# Patient Record
Sex: MALE | Race: WHITE | URBAN - METROPOLITAN AREA
[De-identification: names, ages, dates, MRNs, and addresses within clinical notes are randomized per-mention and may not be internally consistent; named-entity substitution may affect disease eponyms.]

---

## 2018-06-21 ENCOUNTER — OFFICE VISIT (OUTPATIENT)
Dept: FAMILY MEDICINE | Facility: CLINIC | Age: 73
End: 2018-06-21
Payer: COMMERCIAL

## 2018-06-21 VITALS
TEMPERATURE: 97.5 F | HEIGHT: 66 IN | HEART RATE: 64 BPM | DIASTOLIC BLOOD PRESSURE: 72 MMHG | WEIGHT: 154 LBS | SYSTOLIC BLOOD PRESSURE: 124 MMHG | BODY MASS INDEX: 24.75 KG/M2

## 2018-06-21 DIAGNOSIS — F43.10 POST TRAUMATIC STRESS DISORDER DUE TO WAR, TERRORISM, OR HOSTILITY: Primary | ICD-10-CM

## 2018-06-21 PROBLEM — F32.2 MAJOR DEPRESSIVE DISORDER, SINGLE EPISODE, SEVERE WITHOUT PSYCHOTIC FEATURES (H): Status: ACTIVE | Noted: 2018-06-21

## 2018-06-21 PROBLEM — Z86.59 HISTORY OF POSTTRAUMATIC STRESS DISORDER (PTSD): Status: ACTIVE | Noted: 2018-06-21

## 2018-06-21 PROCEDURE — 99203 OFFICE O/P NEW LOW 30 MIN: CPT | Performed by: FAMILY MEDICINE

## 2018-06-21 RX ORDER — CHOLECALCIFEROL (VITAMIN D3) 25 MCG
TABLET,CHEWABLE ORAL DAILY
COMMUNITY

## 2018-06-21 RX ORDER — HYDROXYZINE HYDROCHLORIDE 25 MG/1
25-50 TABLET, FILM COATED ORAL EVERY 6 HOURS PRN
Qty: 60 TABLET | Refills: 1 | Status: SHIPPED | OUTPATIENT
Start: 2018-06-21

## 2018-06-21 RX ORDER — PRAVASTATIN SODIUM 40 MG
40 TABLET ORAL DAILY
COMMUNITY

## 2018-06-21 RX ORDER — HYDROCHLOROTHIAZIDE 25 MG/1
25 TABLET ORAL DAILY
COMMUNITY

## 2018-06-21 RX ORDER — ATENOLOL 25 MG/1
25 TABLET ORAL DAILY
COMMUNITY

## 2018-06-21 RX ORDER — MULTIVITAMIN/IRON/FOLIC ACID 18MG-0.4MG
TABLET ORAL DAILY
COMMUNITY

## 2018-06-21 RX ORDER — LOSARTAN POTASSIUM 100 MG/1
100 TABLET ORAL DAILY
COMMUNITY

## 2018-06-21 NOTE — MR AVS SNAPSHOT
After Visit Summary   6/21/2018    Tank Alexandra    MRN: [id number]           Patient Information     Date Of Birth          1945        Visit Information        Provider Department      6/21/2018 1:40 PM Erik Amin MD [facility]        Today's Diagnoses     Post traumatic stress disorder due to war, terrorism, or hostility    -  1      Care Instructions    [facility]   Discharged by : madina  Paper scripts provided to patient : none     If you have any questions regarding your visit please contact your care team:     Team Gold                Clinic Hours Telephone Number     Dr. Karina Amin  Samira Carringtonbea, CNP 7am-7pm  Monday - Thursday   7am-5pm  Fridays  [phone]   (Appointment scheduling available 24/7)     RN Line  [phone] option 2     Urgent Care - [place] and [facility] - 11am-9pm Monday-Friday Saturday-Sunday- 9am-5pm     [place] -   5pm-9pm Monday-Friday Saturday-Sunday- 9am-5pm    [phone] - [place]    [phone] - [place]       For a Price Quote for your services, please call our Consumer Price Line at [phone].     What options do I have for visits at the clinic other than the traditional office visit?     To expand how we care for you, many of our providers are utilizing electronic visits (e-visits) and telephone visits, when medically appropriate, for interactions with their patients rather than a visit in the clinic. We also offer nurse visits for many medical concerns. Just like any other service, we will bill your insurance company for this type of visit based on time spent on the phone with your provider. Not all insurance companies cover these visits. Please check with your medical insurance if this type of visit is covered. You will be responsible for any charges that are not paid by your insurance.   E-visits via  Oculo Therapyhart: generally incur a $35.00 fee.     Telephone visits:  Time spent on the phone: *charged based on time that is spent on the phone in increments of 10 minutes. Estimated cost:   5-10 mins $30.00   11-20 mins. $59.00   21-30 mins. $85.00       Use Oculo Therapyhart (secure email communication and access to your chart) to send your primary care provider a message or make an appointment. Ask someone on your Team how to sign up for ImmusanTt.     As always, Thank you for trusting us with your health care needs!      [facility]:    Scheduling Appointments  [facility]  Call: [phone]    [facility]  Call: [phone]    [facility]  Call: [phone]    For Gastroenterology referrals   [facility] Gastroenterology   Clinics and Surgery Center, 4th Floor   [address] [place] [address]   Appointments: [phone]    WHERE TO GO FOR CARE?  Clinic    Make an appointment if you:       Are sick (cold, cough, flu, sore throat, earache or in pain).       Have a small injury (sprain, small cut, burn or broken bone).       Need a physical exam, Pap smear, vaccine or prescription refill.       Have questions about your health or medicines.    To reach us:      Call [phone] ([phone]). Open 24 hours every day. (For counseling services, call [phone].)    Log into SilMach at [email or web address]. (Visit [email or web address] to create an account.) Hospital emergency room    An emergency is a serious or life- threatening problem that must be treated right away.    Call 033 or get to the hospital if you have:      Very bad or sudden:            - Chest pain or pressure         - Bleeding         - Head or belly pain         - Dizziness or trouble seeing, walking or                          Speaking      Problems breathing      Blood in your vomit or you are coughing up blood      A major injury (knocked out, loss of  a finger or limb, rape, broken bone protruding from skin)    A mental health crisis. (Or call the Mental Health Crisis line at 1-938.817.6241 or Suicide Prevention Hotline at 1-869.337.7143.)    Open 24 hours every day. You don't need an appointment.     Urgent care    Visit urgent care for sickness or small injuries when the clinic is closed. You don't need an appointment. To check hours or find an urgent care near you, visit [email or web address]. Online care    Get online care from [employer] for more than 70 common problems, like colds, allergies and infections. Open 24 hours every day at:   [email or web address]   Need help deciding?    For advice about where to be seen, you may call your clinic and ask to speak with a nurse. We're here for you 24 hours every day.         If you are deaf or hard of hearing, please let us know. We provide many free services including sign language interpreters, oral interpreters, TTYs, telephone amplifiers, note takers and written materials.                   Follow-ups after your visit        Additional Services     MENTAL HEALTH REFERRAL  - Adult; Outpatient Treatment; Individual/Couples/Family/Group Therapy/Health Psychology; Other: Behavioral Healthcare Providers [phone]; We will contact you to schedule the appointment or please call with any questi...       All scheduling is subject to the client's specific insurance plan & benefits, provider/location availability, and provider clinical specialities.  Please arrive 15 minutes early for your first appointment and bring your completed paperwork.    Please be aware that coverage of these services is subject to the terms and limitations of your health insurance plan.  Call member services at your health plan with any benefit or coverage questions.                            Who to contact     If you have questions or need follow up information about today's clinic visit or your schedule please contact [facility]  "directly at [phone].  Normal or non-critical lab and imaging results will be communicated to you by SnowGatehart, letter or phone within 4 business days after the clinic has received the results. If you do not hear from us within 7 days, please contact the clinic through SnowGatehart or phone. If you have a critical or abnormal lab result, we will notify you by phone as soon as possible.  Submit refill requests through Better ATM Services or call your pharmacy and they will forward the refill request to us. Please allow 3 business days for your refill to be completed.          Additional Information About Your Visit        SnowGateharPurchext Information     Better [employer] Services lets you send messages to your doctor, view your test results, renew your prescriptions, schedule appointments and more. To sign up, go to [email or web address] . Click on \"Log in\" on the left side of the screen, which will take you to the Welcome page. Then click on \"Sign up Now\" on the right side of the page.     You will be asked to enter the access code listed below, as well as some personal information. Please follow the directions to create your username and password.     Your access code is: [email or web address]  Expires: 2018  2:33 PM     Your access code will  in 90 days. If you need help or a new code, please call your [facility] clinic or [phone].        Care EveryWhere ID     This is your Care EveryWhere ID. This could be used by other organizations to access your [facility] medical records  [id number]        Your Vitals Were     Pulse Temperature Height BMI (Body Mass Index)          64 97.5  F (36.4  C) (Oral) 5' 5.83\" (1.672 m) 24.99 kg/m2         Blood Pressure from Last 3 Encounters:   18 124/72    Weight from Last 3 Encounters:   18 154 lb (69.9 kg)              We Performed the Following     MENTAL HEALTH REFERRAL  - Adult; Outpatient Treatment; Individual/Couples/Family/Group Therapy/Health Psychology; Other: Behavioral Healthcare Providers " [phone]; We will contact you to schedule the appointment or please call with any questi...          Today's Medication Changes          These changes are accurate as of 6/21/18  2:33 PM.  If you have any questions, ask your nurse or doctor.               Start taking these medicines.        Dose/Directions    hydrOXYzine 25 MG tablet   Commonly known as:  ATARAX   Used for:  Post traumatic stress disorder due to war, terrorism, or hostility   Started by:  Erik Amin MD        Dose:  25-50 mg   Take 1-2 tablets (25-50 mg) by mouth every 6 hours as needed for anxiety   Quantity:  60 tablet   Refills:  1            Where to get your medicines      Some of these will need a paper prescription and others can be bought over the counter.  Ask your nurse if you have questions.     Bring a paper prescription for each of these medications     hydrOXYzine 25 MG tablet                Primary Care Provider Office Phone # Fax #    Erik Amin [phone]       [address] [id number]        Equal Access to Services     [facility]: Hadii aad ku hadasho Soomaali, waaxda luqadaha, qaybta kaalmada adeegyada, waxay idiin hayalciran nyla yates . So [facility] [phone].    ATENCIÓN: Si habla español, tiene a delgado disposición servicios gratuitos de asistencia lingüística. [facility] [phone].    We comply with applicable federal civil rights laws and [place] laws. We do not discriminate on the basis of race, color, national origin, age, disability, sex, sexual orientation, or gender identity.            Thank you!     Thank you for choosing [facility]  for your care. Our goal is always to provide you with excellent care. Hearing back from our patients is one way we can continue to improve our services. Please take a few minutes to complete the written survey that you may receive in the mail after your visit with us. Thank you!              Your Updated Medication List - Protect others around you: Learn how to safely use, store and throw away your medicines at www.disposemymeds.org.          This list is accurate as of 6/21/18  2:33 PM.  Always use your most recent med list.                   Brand Name Dispense Instructions for use Diagnosis    ASPIRIN LOW DOSE 81 MG EC tablet   Generic drug:  aspirin      Take 81 mg by mouth daily        atenolol 25 MG tablet    TENORMIN     Take 25 mg by mouth daily        B-12 2500 MCG Tabs      Take by mouth daily        * HM MENS 50+ ADVANCED ONE DAILY Tabs      Take by mouth daily        * EMERGEN-C IMMUNE PO      Take by mouth daily        hydrochlorothiazide 25 MG tablet    HYDRODIURIL     Take 25 mg by mouth daily        hydrOXYzine 25 MG tablet    ATARAX    60 tablet    Take 1-2 tablets (25-50 mg) by mouth every 6 hours as needed for anxiety    Post traumatic stress disorder due to war, terrorism, or hostility       losartan 100 MG tablet    COZAAR     Take 100 mg by mouth daily        pravastatin 40 MG tablet    PRAVACHOL     Take 40 mg by mouth daily        VITAMIN C PO      Take by mouth daily        * Notice:  This list has 2 medication(s) that are the same as other medications prescribed for you. Read the directions carefully, and ask your doctor or other care provider to review them with you.

## 2018-06-21 NOTE — PATIENT INSTRUCTIONS
[facility]   Discharged by : madina  Paper scripts provided to patient : none     If you have any questions regarding your visit please contact your care team:     Team Gold                Clinic Hours Telephone Number     Dr. Karina Amin  Samira Pelaez, CNP 7am-7pm  Monday - Thursday   7am-5pm  Fridays  [phone]   (Appointment scheduling available 24/7)     RN Line  [phone] option 2     [facility] [place] [facility] 11am-9pm Monday-Friday Saturday-Sunday- 9am-5pm     [place] -   5pm-9pm Monday-Friday Saturday-Sunday- 9am-5pm    [phone] - [place]    [phone] - [place]       For a Price Quote for your services, please call our Widgetbox Price Line at [phone].     What options do I have for visits at the clinic other than the traditional office visit?     To expand how we care for you, many of our providers are utilizing electronic visits (e-visits) and telephone visits, when medically appropriate, for interactions with their patients rather than a visit in the clinic. We also offer nurse visits for many medical concerns. Just like any other service, we will bill your insurance company for this type of visit based on time spent on the phone with your provider. Not all insurance companies cover these visits. Please check with your medical insurance if this type of visit is covered. You will be responsible for any charges that are not paid by your insurance.   E-visits via Hotswap: generally incur a $35.00 fee.     Telephone visits:  Time spent on the phone: *charged based on time that is spent on the phone in increments of 10 minutes. Estimated cost:   5-10 mins $30.00   11-20 mins. $59.00   21-30 mins. $85.00       Use Hotswap (secure email communication and access to your chart) to send your primary care provider a message or make an appointment. Ask someone on your Team how to sign up  for [employer].     As always, Thank you for trusting us with your health care needs!      [facility]:    Scheduling Appointments  [facility]  Call: [phone]    [facility]  Call: [phone]    [facility]  Call: [phone]    For Gastroenterology referrals   [facility] Gastroenterology   Clinics and Surgery Center, 4th Floor   [address] [place] [address]   Appointments: [phone]    WHERE TO GO FOR CARE?  Clinic    Make an appointment if you:       Are sick (cold, cough, flu, sore throat, earache or in pain).       Have a small injury (sprain, small cut, burn or broken bone).       Need a physical exam, Pap smear, vaccine or prescription refill.       Have questions about your health or medicines.    To reach us:      Call [phone] ([phone]). Open 24 hours every day. (For counseling services, call [phone].)    Log into Edgewood Services at [email or web address].Convozine. (Visit [email or web address].Convozine to create an account.) Hospital emergency room    An emergency is a serious or life- threatening problem that must be treated right away.    Call 051 or get to the hospital if you have:      Very bad or sudden:            - Chest pain or pressure         - Bleeding         - Head or belly pain         - Dizziness or trouble seeing, walking or                          Speaking      Problems breathing      Blood in your vomit or you are coughing up blood      A major injury (knocked out, loss of a finger or limb, rape, broken bone protruding from skin)    A mental health crisis. (Or call the Mental Health Crisis line at 1-863.438.7342 or Suicide Prevention Hotline at 1-385.362.8170.)    Open 24 hours every day. You don't need an appointment.     Urgent care    Visit urgent care for sickness or small injuries when the clinic is closed. You don't need an appointment. To check hours or find an urgent care near you,  visit [email or web address]. Online care    Get online care from OnCare for more than 70 common problems, like colds, allergies and infections. Open 24 hours every day at:   [email or web address]   Need help deciding?    For advice about where to be seen, you may call your clinic and ask to speak with a nurse. We're here for you 24 hours every day.         If you are deaf or hard of hearing, please let us know. We provide many free services including sign language interpreters, oral interpreters, TTYs, telephone amplifiers, note takers and written materials.

## 2018-06-21 NOTE — PROGRESS NOTES
SUBJECTIVE:   Tank Alexandra is a 73 year old male who presents to clinic today for the following health issues:      Concern - PTSD  Onset: 40 years off/on    Description:   Patient states that he gets mad easily    Intensity: severe    Progression of Symptoms:  Worsening at times, due to situations     Accompanying Signs & Symptoms:  Doesn't want to do anything    Previous history of similar problem:   yes    Precipitating factors:   Worsened by: nothing in particular    Alleviating factors:  Improved by: none    The[facility] into a [facility]    Patient reports that he has been previously diagnosed with posttraumatic stress disorder.  [facility] of this as long as 18 years ago.  Symptoms are as noted above.  It sounds like he underwent some neuropsych testing in the past and they told him that he had anxiety and was malingering.  He works at the [employer] in [place] and does appear to have some intermittent panic type symptoms for which she has been admitted.  Is not on any specific treatment for PTSD at this time.    PHQ-9 SCORE 6/23/2018   Total Score 18     KRISTA-7 SCORE 6/23/2018   Total Score 13           Problem list and histories reviewed & adjusted, as indicated.  Additional history: as documented    Patient Active Problem List   Diagnosis     Major depressive disorder, single episode, severe without psychotic features (H)     History of posttraumatic stress disorder (PTSD)     Post traumatic stress disorder due to war, terrorism, or hostility     Past Surgical History:   Procedure Laterality Date     BIOPSY KIDNEY       MOHS MICROGRAPHIC PROCEDURE      skin surgery       Social History   Substance Use Topics     Smoking status: Former Smoker     Types: Cigarettes     Quit date: 6/21/1986     Smokeless tobacco: Never Used     Alcohol use Yes      Comment: occasionally     Family History  "  Problem Relation Age of Onset     Adopted: Yes     Chronic Obstructive Pulmonary Disease Mother      Diabetes No family hx of      Hypertension No family hx of      Breast Cancer No family hx of      Colon Cancer No family hx of      Prostate Cancer No family hx of      Other Cancer No family hx of          Current Outpatient Prescriptions   Medication Sig Dispense Refill     Ascorbic Acid (VITAMIN C PO) Take by mouth daily       aspirin (ASPIRIN LOW DOSE) 81 MG EC tablet Take 81 mg by mouth daily       atenolol (TENORMIN) 25 MG tablet Take 25 mg by mouth daily       Cyanocobalamin (B-12) 2500 MCG TABS Take by mouth daily       hydrochlorothiazide (HYDRODIURIL) 25 MG tablet Take 25 mg by mouth daily       hydrOXYzine (ATARAX) 25 MG tablet Take 1-2 tablets (25-50 mg) by mouth every 6 hours as needed for anxiety 60 tablet 1     losartan (COZAAR) 100 MG tablet Take 100 mg by mouth daily       Multiple Vitamins-Minerals (EMERGEN-C IMMUNE PO) Take by mouth daily       Multiple Vitamins-Minerals (HM MENS 50+ ADVANCED ONE DAILY) TABS Take by mouth daily       pravastatin (PRAVACHOL) 40 MG tablet Take 40 mg by mouth daily       No Known Allergies    Reviewed and updated as needed this visit by clinical staff  Tobacco  Allergies  Meds  Problems  Med Hx  Surg Hx  Fam Hx  Soc Hx        Reviewed and updated as needed this visit by Provider  Tobacco  Allergies  Meds  Problems  Med Hx  Surg Hx  Fam Hx  Soc Hx          ROS:  Constitutional, HEENT, cardiovascular, pulmonary, gi and gu systems are negative, except as otherwise noted.    OBJECTIVE:     /72 (Cuff Size: Adult Regular)  Pulse 64  Temp 97.5  F (36.4  C) (Oral)  Ht 5' 5.83\" (1.672 m)  Wt 154 lb (69.9 kg)  BMI 24.99 kg/m2  Body mass index is 24.99 kg/(m^2).  GENERAL: healthy, alert and no distress  EYES: Eyes grossly normal to inspection, PERRL and conjunctivae and sclerae normal  HENT: ear canals and TM's normal, nose and mouth without ulcers or " lesions  NECK: no adenopathy, no asymmetry, masses, or scars and thyroid normal to palpation  RESP: lungs clear to auscultation - no rales, rhonchi or wheezes  CV: regular rate and rhythm, normal S1 S2, no S3 or S4, no murmur, click or rub, no peripheral edema and peripheral pulses strong  MS: no gross musculoskeletal defects noted, no edema  NEURO: Normal strength and tone, mentation intact and speech normal  PSYCH: mentation appears normal, affect flat and judgement and insight intact    Diagnostic Test Results:  none     ASSESSMENT/PLAN:             1. Post traumatic stress disorder due to war, terrorism, or hostility  At this time I would like [facility] to [facility] a diagnosis of [facility].  We talked about some possible treatment options and put in a referral for mental health evaluation at this [facility] as he has not been confident about what he has been provided with there.  We did not schedule a specific follow-up at this time unless he would like to explore medical treatment options more.  For the short-term use of panic symptoms a prescription for hydroxyzine was issued, and discussed with the patient today.  He seems open to trying that.  - MENTAL HEALTH REFERRAL  - Adult; Outpatient Treatment; Individual/Couples/Family/Group Therapy/Health Psychology; Other: Behavioral Healthcare Providers [phone]; We will contact you to schedule the appointment or please call with any questi...  - hydrOXYzine (ATARAX) 25 MG tablet; Take 1-2 tablets (25-50 mg) by mouth every 6 hours as needed for anxiety  Dispense: 60 tablet; Refill: 1    CONSULTATION/REFERRAL to mental health    Erik Amin MD  [facility]

## 2018-06-23 ASSESSMENT — ANXIETY QUESTIONNAIRES
GAD7 TOTAL SCORE: 13
7. FEELING AFRAID AS IF SOMETHING AWFUL MIGHT HAPPEN: NEARLY EVERY DAY
6. BECOMING EASILY ANNOYED OR IRRITABLE: MORE THAN HALF THE DAYS
5. BEING SO RESTLESS THAT IT IS HARD TO SIT STILL: SEVERAL DAYS
IF YOU CHECKED OFF ANY PROBLEMS ON THIS QUESTIONNAIRE, HOW DIFFICULT HAVE THESE PROBLEMS MADE IT FOR YOU TO DO YOUR WORK, TAKE CARE OF THINGS AT HOME, OR GET ALONG WITH OTHER PEOPLE: VERY DIFFICULT
2. NOT BEING ABLE TO STOP OR CONTROL WORRYING: MORE THAN HALF THE DAYS
3. WORRYING TOO MUCH ABOUT DIFFERENT THINGS: MORE THAN HALF THE DAYS
1. FEELING NERVOUS, ANXIOUS, OR ON EDGE: MORE THAN HALF THE DAYS

## 2018-06-23 ASSESSMENT — PATIENT HEALTH QUESTIONNAIRE - PHQ9: 5. POOR APPETITE OR OVEREATING: SEVERAL DAYS

## 2018-06-24 ASSESSMENT — PATIENT HEALTH QUESTIONNAIRE - PHQ9: SUM OF ALL RESPONSES TO PHQ QUESTIONS 1-9: 18

## 2018-06-24 ASSESSMENT — ANXIETY QUESTIONNAIRES: GAD7 TOTAL SCORE: 13

## 2018-09-15 ENCOUNTER — NURSE TRIAGE (OUTPATIENT)
Dept: NURSING | Facility: CLINIC | Age: 73
End: 2018-09-15

## 2018-09-15 NOTE — TELEPHONE ENCOUNTER
Patient calling to make an appointment with PCP. Refused Triage. Transferred patient to central scheduling to make appointment.     iNma Corey RN  [facility]       Reason for Disposition    Requesting regular office appointment    Protocols used: INFORMATION ONLY CALL-ADULT-AH

## 2018-09-21 ENCOUNTER — TELEPHONE (OUTPATIENT)
Dept: FAMILY MEDICINE | Facility: CLINIC | Age: 73
End: 2018-09-21

## 2018-09-27 ENCOUNTER — OFFICE VISIT (OUTPATIENT)
Dept: FAMILY MEDICINE | Facility: CLINIC | Age: 73
End: 2018-09-27
Payer: COMMERCIAL

## 2018-09-27 VITALS
HEIGHT: 66 IN | WEIGHT: 152 LBS | TEMPERATURE: 98.1 F | BODY MASS INDEX: 24.43 KG/M2 | HEART RATE: 73 BPM | SYSTOLIC BLOOD PRESSURE: 156 MMHG | DIASTOLIC BLOOD PRESSURE: 81 MMHG

## 2018-09-27 DIAGNOSIS — F43.10 POST TRAUMATIC STRESS DISORDER DUE TO WAR, TERRORISM, OR HOSTILITY: Primary | ICD-10-CM

## 2018-09-27 PROCEDURE — 99214 OFFICE O/P EST MOD 30 MIN: CPT | Performed by: FAMILY MEDICINE

## 2018-09-27 NOTE — PROGRESS NOTES
SUBJECTIVE:   Tank Alexandra is a 73 year old male who presents to clinic today for the following health issues:      * follow-up on PTSD    Pedro is here for follow-up today.  He brought in a letter produced by family member documenting changes that were observed prior to his service in [place] and after his service.  Copy of that will be scanned into the chart.    He is about 80% service connected disability with a large portion of that related to a diagnosis of PTSD.  Recently he was told that instead his diagnosis is anxiety and that he was malingering.  Apparently there is some type of hearing or determination coming up in the near future and he fears losing his care benefits related to this.  There are no new symptoms.  He is here with his wife and long-term friends who can confirm many of the effects.    We reviewed the DSM-V criteria for diagnosis of PTSD.  Patient reports to me that substance use and medical issues [facility] as a [facility] of his current symptoms.  Please see the dictated letter for further details.        Problem list and histories reviewed & adjusted, as indicated.  Additional history: as documented    Patient Active Problem List   Diagnosis     Major depressive disorder, single episode, severe without psychotic features (H)     History of posttraumatic stress disorder (PTSD)     Post traumatic stress disorder due to war, terrorism, or hostility     Past Surgical History:   Procedure Laterality Date     BIOPSY KIDNEY       MOHS MICROGRAPHIC PROCEDURE      skin surgery       Social History   Substance Use Topics     Smoking status: Former Smoker     Types: Cigarettes     Quit date: 6/21/1986     Smokeless tobacco: Never Used     Alcohol use Yes      Comment: occasionally     Family History   Problem Relation Age of Onset     Adopted: Yes     Chronic Obstructive Pulmonary Disease Mother      Diabetes No family hx of      Hypertension No family hx of       "Breast Cancer No family hx of      Colon Cancer No family hx of      Prostate Cancer No family hx of      Other Cancer No family hx of          Current Outpatient Prescriptions   Medication Sig Dispense Refill     Ascorbic Acid (VITAMIN C PO) Take by mouth daily       aspirin (ASPIRIN LOW DOSE) 81 MG EC tablet Take 81 mg by mouth daily       atenolol (TENORMIN) 25 MG tablet Take 25 mg by mouth daily       Cyanocobalamin (B-12) 2500 MCG TABS Take by mouth daily       hydrochlorothiazide (HYDRODIURIL) 25 MG tablet Take 25 mg by mouth daily       hydrOXYzine (ATARAX) 25 MG tablet Take 1-2 tablets (25-50 mg) by mouth every 6 hours as needed for anxiety 60 tablet 1     losartan (COZAAR) 100 MG tablet Take 100 mg by mouth daily       Multiple Vitamins-Minerals (EMERGEN-C IMMUNE PO) Take by mouth daily       Multiple Vitamins-Minerals (HM MENS 50+ ADVANCED ONE DAILY) TABS Take by mouth daily       pravastatin (PRAVACHOL) 40 MG tablet Take 40 mg by mouth daily       No Known Allergies    Reviewed and updated as needed this visit by clinical staff  Tobacco  Allergies  Meds  Med Hx  Surg Hx  Fam Hx  Soc Hx      Reviewed and updated as needed this visit by Provider  Tobacco  Med Hx  Surg Hx  Fam Hx  Soc Hx        ROS:  Constitutional, HEENT, cardiovascular, pulmonary, gi and gu systems are negative, except as otherwise noted.    OBJECTIVE:     /81  Pulse 73  Temp 98.1  F (36.7  C) (Oral)  Ht 5' 5.75\" (1.67 m)  Wt 152 lb (68.9 kg)  BMI 24.72 kg/m2  Body mass index is 24.72 kg/(m^2).  GENERAL: healthy, alert and no distress  EYES: Eyes grossly normal to inspection, PERRL and conjunctivae and sclerae normal  MS: no gross musculoskeletal defects noted, no edema  NEURO: Normal strength and tone, mentation intact and speech normal  PSYCH: mentation appears normal, concentration poor, affect flat, judgement and insight intact and appearance well groomed    Diagnostic Test Results:  none     ASSESSMENT/PLAN: "             1. Post traumatic stress disorder due to war, terrorism, or hostility  We reviewed the criteria for diagnosis of PTSD.  He does appear to meet criteria for diagnosis of PTSD.  I produce a [facility].  I did state that the influence of this letter might be limited as I am not a mental health specialist.  Previously we had referred him to mental health specialty but that had not been followed through with.  I am happy to see him back for any other health concerns he may have but [facility] at [facility] as his [facility] is currently [facility] of cost.    The majority of this 30-minute visit was spent in review of the diagnostic criteria for PTSD and production of the letter.      Erik Amin MD  [facility]

## 2018-09-27 NOTE — MR AVS SNAPSHOT
"              After Visit Summary   9/27/2018    Tank Alexandra    MRN: [id number]           Patient Information     Date Of Birth          1945        Visit Information        Provider Department      9/27/2018 6:20 PM Erik Amin MD [facility]        Today's Diagnoses     Post traumatic stress disorder due to war, terrorism, or hostility    -  1       Follow-ups after your visit        Follow-up notes from your care team     Return in about 6 months (around 3/27/2019) for Routine Visit.      Who to contact     If you have questions or need follow up information about today's clinic visit or your schedule please contact [facility] directly at [phone].  Normal or non-critical lab and imaging results will be communicated to you by MyChart, letter or phone within 4 business days after the clinic has received the results. If you do not hear from us within 7 days, please contact the clinic through MyChart or phone. If you have a critical or abnormal lab result, we will notify you by phone as soon as possible.  Submit refill requests through [employer] or call your pharmacy and they will forward the refill request to us. Please allow 3 business days for your refill to be completed.          Additional Information About Your Visit        Care EveryWhere ID     This is your Care EveryWhere ID. This could be used by other organizations to access your [facility] medical records  [id number]        Your Vitals Were     Pulse Temperature Height BMI (Body Mass Index)          73 98.1  F (36.7  C) (Oral) 5' 5.75\" (1.67 m) 24.72 kg/m2         Blood Pressure from Last 3 Encounters:   09/27/18 156/81   06/21/18 124/72    Weight from Last 3 Encounters:   09/27/18 152 lb (68.9 kg)   06/21/18 154 lb (69.9 kg)              Today, you had the following     No orders found for display       Primary Care Provider Office Phone # Fax #    Erik Amin [phone] " [phone]       [address] [facility] [address]        Equal Access to Services     MANNY RED : Hadii aad ku hadmadidedra Socatrachoali, waaxda luqadaha, qaybta kaalmada nylashadia, lloyd garcia vincenzomelody mujicabharat cornelianoymone malave. So [facility] [phone].    ATENCIÓN: Si habla español, tiene a delgado disposición servicios gratuitos de asistencia lingüística. Llame al [phone].    We comply with applicable federal civil rights laws and [place] laws. We do not discriminate on the basis of race, color, national origin, age, disability, sex, sexual orientation, or gender identity.            Thank you!     Thank you for choosing [facility] [place]  for your care. Our goal is always to provide you with excellent care. Hearing back from our patients is one way we can continue to improve our services. Please take a few minutes to complete the written survey that you may receive in the mail after your visit with us. Thank you!             Your Updated Medication List - Protect others around you: Learn how to safely use, store and throw away your medicines at www.disposemymeds.org.          This list is accurate as of 9/27/18 11:59 PM.  Always use your most recent med list.                   Brand Name Dispense Instructions for use Diagnosis    ASPIRIN LOW DOSE 81 MG EC tablet   Generic drug:  aspirin      Take 81 mg by mouth daily        atenolol 25 MG tablet    TENORMIN     Take 25 mg by mouth daily        B-12 2500 MCG Tabs      Take by mouth daily        * HM MENS 50+ ADVANCED ONE DAILY Tabs      Take by mouth daily        * EMERGEN-C IMMUNE PO      Take by mouth daily        hydrochlorothiazide 25 MG tablet    HYDRODIURIL     Take 25 mg by mouth daily        hydrOXYzine 25 MG tablet    ATARAX    60 tablet    Take 1-2 tablets (25-50 mg) by mouth every 6 hours as needed for anxiety    Post traumatic stress disorder due to war, terrorism, or hostility       losartan 100 MG tablet    COZAAR     Take 100 mg by  mouth daily        pravastatin 40 MG tablet    PRAVACHOL     Take 40 mg by mouth daily        VITAMIN C PO      Take by mouth daily        * Notice:  This list has 2 medication(s) that are the same as other medications prescribed for you. Read the directions carefully, and ask your doctor or other care provider to review them with you.

## 2018-09-27 NOTE — LETTER
18    Demographic Information on Tank Alexandra:    Tank Alexandra  Gender: male  : 1945  [address] [place] [address]  [phone] (home)       To Whom It May Concern:    This patient has come to me requesting documentation of symptoms to confirm a diagnosis of posttraumatic stress disorder (PTSD).    Criteria A: Stressor    Patient reports to me that he had direct exposure to significant trauma with actual or threatened serious injury while serving in the [place].  Based on this report he meets criteria.    Criteria B: Intrusion symptoms    Patient reports to me significant traumatic nightmares, intense distress after exposure to traumatic reminders, and physiologic reactivity such as sweating, palpitations, and fear of death, after exposure to trauma related stimuli.  Based on this report he meets criteria.    Criteria C: Avoidance    Patient reports avoidance of trauma related external reminders such as particular sounds, activities, or situations that may trigger physiologic symptoms.  Based on this report he meets criteria.    Criteria D: Negative alterations in cognitions and mood    Patient reports to me persistent but not distorted negative beliefs and expectations about 1 self, persistent negative trauma related emotions, markedly diminished interest in activities, and a feeling of detachment from others.  On examination today, affect is constricted.  Based on this report he meets criteria.    Criteria E: Alterations in arousal and reactivity    Patient reports to me symptoms of exaggerated startle response, problems and concentration, and persistent sleep disturbance.  Based on this report he meets criteria    Criteria F: Duration    Patient satisfies criteria based on report of symptoms persistent for many years.    Criteria G: Functional significance    Patient reports significant social functional impairment related to Criteria A through F and therefore meets Criteria  G.    Criteria H:    Patient denies any illicit substance use or excessive alcohol use.  He reports the previous medical evaluation has indicated that his current medications and other illnesses are not responsible for the symptoms noted above.    As represented by the patient to me and corroborative information provided by his sibling that was given to me, patient meets criteria for posttraumatic stress disorder, F 43.10.      Sincerely,        Erik Amin MD  Board Certified in Family Medicine

## 2020-01-08 ENCOUNTER — TELEPHONE (OUTPATIENT)
Dept: FAMILY MEDICINE | Facility: CLINIC | Age: 75
End: 2020-01-08

## 2020-01-08 NOTE — LETTER
January 8, 2020    Tank Alexandra  [address] [address] [facility] [address]    Dear Tank    We care about your health and have reviewed your health plan. We have reviewed your medical conditions, medication list, and lab results and are making recommendations based on this review, to better manage your health.    You are in particular need of attention regarding:  - Your Depression  - Scheduling a Colon Cancer Screening (Colonoscopy only) [phone]  - Scheduling a Wellness (Physical/Lab) Visit age 65 and older [phone]      Here is a list of Health Maintenance topics that are due now or due soon:  Health Maintenance Due   Topic Date Due     HEPATITIS C SCREENING  1945     ADVANCE CARE PLANNING  1945     DEPRESSION ACTION PLAN  1945     COLONOSCOPY  03/03/1957     DTAP/TDAP/TD IMMUNIZATION (1 - Tdap) 03/03/1958     LIPID  03/03/1982     ZOSTER IMMUNIZATION (1 of 2) 03/03/1997     MEDICARE ANNUAL WELLNESS VISIT  03/03/2012     FALL RISK ASSESSMENT  03/03/2012     PNEUMOCOCCAL IMMUNIZATION 65+ LOW/MEDIUM RISK (1 of 2 - PCV13) 03/03/2012     AORTIC ANEURYSM SCREENING (SYSTEM ASSIGNED)  03/03/2012     PHQ-9  12/21/2018     INFLUENZA VACCINE (1) 09/01/2019     Please call us at [phone] (or use [employer]) to address the above recommendations. If we do not hear from you in the next couple of weeks we will be reaching out to you again.  Thank you for trusting [facility] and we appreciate the opportunity to serve you.  We look forward to supporting your healthcare needs in the future.  Healthy Regards,  Your Care Team

## 2020-01-08 NOTE — LETTER
January 16, 2020    Tank Alexandra  [address] [place] [address]      Dear Tank Alexandra,     We have tried to contact you about your health, but have been unable to reach you.  Please call us as soon as possible so we can provide you with the best care possible.  We will continue to check in with you throughout the year to complete these items of care, if you are not able to complete these items at this time.  If you would like to complete the missing items for your care, please contact us at [phone].    We recommend the following:  -schedule a WELLNESS (Physical) APPOINTMENT with your provider.   I will check fasting labs the same day - nothing to eat except water and meds for 8-10 hours prior.    -schedule a COLONOSCOPY to look for colon cancer (due every 10 years or 5 years in higher risk situations.)   Colon cancer is now the second leading cause of death in the [place] for both men and women and there are over 130,000 new cases and 50,000 deaths per year from colon cancer.  Colonoscopies can prevent 90-95% of these deaths.  Problem lesions can be removed before they ever become cancer.  This test is not only looking for cancer, but also getting rid of precancerious lesions.  If you do not wish to do a colonoscopy or cannot afford to do one, at this time, there is another option. It is called a FIT test.  -Depression      Sincerely,     Your Care Team at [place]

## 2020-01-08 NOTE — TELEPHONE ENCOUNTER
Panel Management Review      Patient has the following on his problem list:     Depression / Dysthymia review    Measure:  Needs PHQ-9 score of 4 or less during index window.  Administer PHQ-9 and if score is 5 or more, send encounter to provider for next steps.    5 - 7 month window range:     PHQ-9 SCORE 6/23/2018   PHQ-9 Total Score 18       If PHQ-9 recheck is 5 or more, route to provider for next steps.    Patient is due for:  PHQ9 and DAP      Composite cancer screening  Chart review shows that this patient is due/due soon for the following Colonoscopy  Summary:    Patient is due/failing the following:   COLONOSCOPY, DAP, PHQ9 and PHYSICAL    Action needed:   Patient needs office visit for Physical., Patient needs to do PHQ9. and Patient needs referral/order: colonoscopy    Type of outreach:    Sent letter. and copy of PHQ9    Questions for provider review:    None                                                                                                                                    Sandra Call MA       Chart routed to Care Team .

## 2020-01-16 NOTE — TELEPHONE ENCOUNTER
Panel Management Review  Summary:    Type of outreach:    Sent letter. Final    Encounter routed to No Action Needed.                                                                                                                               Sandra Call MA

## 2020-01-16 NOTE — TELEPHONE ENCOUNTER
1/16/20-Panel Management Review  Summary:    Type of outreach:    Call attempt.    Encounter routed to No Action Needed.                                                                                                                               Sandra Call MA

## 2024-11-05 NOTE — TELEPHONE ENCOUNTER
Reason for call:  Other   Patient called regarding (reason for call): call back  Additional comments: PT was asked to cancel apt wanting to see if pcp can get in next week    Phone number to reach patient:  Cell number on file:    Telephone Information:   Mobile [phone]       Best Time:  asap    Can we leave a detailed message on this number?  YES   What Is The Reason For Today's Visit?: Full Body Skin Examination What Is The Reason For Today's Visit? (Being Monitored For X): concerning skin lesions on an annual basis